# Patient Record
Sex: MALE | Race: AMERICAN INDIAN OR ALASKA NATIVE
[De-identification: names, ages, dates, MRNs, and addresses within clinical notes are randomized per-mention and may not be internally consistent; named-entity substitution may affect disease eponyms.]

---

## 2021-05-14 ENCOUNTER — HOSPITAL ENCOUNTER (OUTPATIENT)
Dept: HOSPITAL 5 - CT | Age: 66
Discharge: HOME | End: 2021-05-14
Payer: COMMERCIAL

## 2021-05-14 DIAGNOSIS — M41.80: ICD-10-CM

## 2021-05-14 DIAGNOSIS — K80.20: Primary | ICD-10-CM

## 2021-05-14 DIAGNOSIS — M89.8X8: ICD-10-CM

## 2021-05-14 DIAGNOSIS — K76.89: ICD-10-CM

## 2021-05-14 LAB — BUN SERPL-MCNC: 16 MG/DL (ref 9–20)

## 2021-05-14 PROCEDURE — 71120 X-RAY EXAM BREASTBONE 2/>VWS: CPT

## 2021-05-14 PROCEDURE — 82565 ASSAY OF CREATININE: CPT

## 2021-05-14 PROCEDURE — 36415 COLL VENOUS BLD VENIPUNCTURE: CPT

## 2021-05-14 PROCEDURE — 74178 CT ABD&PLV WO CNTR FLWD CNTR: CPT

## 2021-05-14 PROCEDURE — 84520 ASSAY OF UREA NITROGEN: CPT

## 2021-05-14 NOTE — XRAY REPORT
Sternum-4 total views



INDICATION:  OTHER SPECIFIED DISORDERS OF BONE,OTHER SITE.



COMPARISON: None.



IMPRESSION:  There is motion artifact on the 2 lateral views which significantly limits evaluation. Q
uestionable irregularity of the distal sternal body is noted. Recommend follow-up CT chest without co
ntrast for further evaluation.  No rib fracture identified. There is scoliotic curvature of the spine
 with grossly clear lungs.



Signer Name: Janusz Lay MD 

Signed: 5/14/2021 1:48 PM

Workstation Name: [id number]

## 2021-05-14 NOTE — CAT SCAN REPORT
CT abdomen pelvis wo/w con



INDICATION:

Abdominal pain..



COMPARISON: None



TECHNIQUE: Abdominal and pelvic CT exam performed. All CT scans at this location are performed using 
CT dose reduction for ALARA by means of automated exposure control.



FINDINGS:



CT ABDOMEN and PELVIS:

Lung Bases: No significant abnormality.

Liver: Scattered hepatic cysts.

Biliary: Gallstones without gallbladder wall thickening or pericholecystic fluid. 

Spleen: No significant abnormality.

Pancreas: No significant abnormality.

Adrenals: No significant abnormality.

Kidneys: No significant abnormality.

Lymphatics: No lymphadenopathy.

Vasculature: No significant abnormality. 

Bowel: No significant abnormality.  Appendix is surgically absent.

Pelvis: No significant abnormality.

Osseous Structures: No aggressive osseous lesion. Levoscoliosis with apex at L4.

Additional Findings: None



IMPRESSION:

1. No acute abnormality of the abdomen or pelvis.

2. Cholelithiasis without cholecystitis.

3. Levoscoliosis.



Signer Name: Navi Soto MD 

Signed: 5/14/2021 12:20 PM

Workstation Name: [id number]

## 2021-08-04 ENCOUNTER — OFFICE VISIT (OUTPATIENT)
Dept: URBAN - METROPOLITAN AREA CLINIC 118 | Facility: CLINIC | Age: 66
End: 2021-08-04
Payer: COMMERCIAL

## 2021-08-04 DIAGNOSIS — Z12.11 COLON CANCER SCREENING: ICD-10-CM

## 2021-08-04 DIAGNOSIS — K59.03 DRUG-INDUCED CONSTIPATION: ICD-10-CM

## 2021-08-04 DIAGNOSIS — I25.10 CORONARY ARTERY DISEASE INVOLVING NATIVE CORONARY ARTERY OF NATIVE HEART WITHOUT ANGINA PECTORIS: ICD-10-CM

## 2021-08-04 PROCEDURE — 99203 OFFICE O/P NEW LOW 30 MIN: CPT | Performed by: INTERNAL MEDICINE

## 2021-08-04 PROCEDURE — 99243 OFF/OP CNSLTJ NEW/EST LOW 30: CPT | Performed by: INTERNAL MEDICINE

## 2021-08-04 RX ORDER — NITROGLYCERIN 0.4 MG/1
TABLET SUBLINGUAL
Qty: 25 | Status: ACTIVE | COMMUNITY

## 2021-08-04 RX ORDER — AMOXICILLIN 500 MG/1
CAPSULE ORAL
Qty: 12 | Status: ACTIVE | COMMUNITY

## 2021-08-04 RX ORDER — ATORVASTATIN CALCIUM 40 MG/1
TABLET, FILM COATED ORAL
Qty: 30 | Status: ACTIVE | COMMUNITY

## 2021-08-04 RX ORDER — TRAMADOL HYDROCHLORIDE 50 MG/1
TABLET ORAL
Qty: 30 | Status: ACTIVE | COMMUNITY

## 2021-08-04 RX ORDER — DILTIAZEM HYDROCHLORIDE 120 MG/1
CAPSULE, EXTENDED RELEASE ORAL
Qty: 30 | Status: ACTIVE | COMMUNITY

## 2021-08-04 RX ORDER — ASPIRIN 325 MG/1
1 TABLET TABLET, FILM COATED ORAL ONCE A DAY
Status: ACTIVE | COMMUNITY

## 2021-08-04 NOTE — HPI-TODAY'S VISIT:
The patient is referred by Dr. Shafer for a screening colonoscopy.  His last was in 2002 and he reports it was negative.  There is a family history of colon polyps in his brother, but not colon cancer.  The patient denies rectal bleeding, abnormal loss of weight, or severe abdominal pain.  He does have chronic constipation which he attributes to his new heart disease medication.  He did have a myocardial infarction in 2021; however no stents were placed as the disease was not severe enough.  Coronary risk stratification has not yet been obtained.  He has no exertional chest pain, dyspnea on exertion, or shortness of breath at baseline.  Note sent.

## 2021-08-06 ENCOUNTER — DASHBOARD ENCOUNTERS (OUTPATIENT)
Age: 66
End: 2021-08-06

## 2021-08-06 PROBLEM — 443502000: Status: ACTIVE | Noted: 2021-08-06

## 2021-09-24 ENCOUNTER — TELEPHONE ENCOUNTER (OUTPATIENT)
Dept: URBAN - METROPOLITAN AREA CLINIC 40 | Facility: CLINIC | Age: 66
End: 2021-09-24

## 2021-09-28 ENCOUNTER — OFFICE VISIT (OUTPATIENT)
Dept: URBAN - METROPOLITAN AREA SURGERY CENTER 23 | Facility: SURGERY CENTER | Age: 66
End: 2021-09-28
Payer: COMMERCIAL

## 2021-09-28 DIAGNOSIS — Z83.71 FAMILY HISTORY OF COLON POLYPS: ICD-10-CM

## 2021-09-28 DIAGNOSIS — Z12.11 AVERAGE RISK FOR CRC. DUE TO PT'S CO-MORBID STATE WITH END STAGE DEMENTIA, HIGH RISK FOR ANESTHESIA (PER NEUROLOGY); INABILITY TO TAKE A BOWEL PREP....WOULD NOT ADVISE ANY COLORECTAL CANCER SCREENING INCLUDING STOOL TEST FOR FECAL BLOOD.: ICD-10-CM

## 2021-09-28 PROCEDURE — G8907 PT DOC NO EVENTS ON DISCHARG: HCPCS | Performed by: INTERNAL MEDICINE

## 2021-09-28 PROCEDURE — G0105 COLORECTAL SCRN; HI RISK IND: HCPCS | Performed by: INTERNAL MEDICINE

## 2021-09-28 RX ORDER — ASPIRIN 325 MG/1
1 TABLET TABLET, FILM COATED ORAL ONCE A DAY
Status: ACTIVE | COMMUNITY

## 2021-09-28 RX ORDER — AMOXICILLIN 500 MG/1
CAPSULE ORAL
Qty: 12 | Status: ACTIVE | COMMUNITY

## 2021-09-28 RX ORDER — TRAMADOL HYDROCHLORIDE 50 MG/1
TABLET ORAL
Qty: 30 | Status: ACTIVE | COMMUNITY

## 2021-09-28 RX ORDER — NITROGLYCERIN 0.4 MG/1
TABLET SUBLINGUAL
Qty: 25 | Status: ACTIVE | COMMUNITY

## 2021-09-28 RX ORDER — DILTIAZEM HYDROCHLORIDE 120 MG/1
CAPSULE, EXTENDED RELEASE ORAL
Qty: 30 | Status: ACTIVE | COMMUNITY

## 2021-09-28 RX ORDER — ATORVASTATIN CALCIUM 40 MG/1
TABLET, FILM COATED ORAL
Qty: 30 | Status: ACTIVE | COMMUNITY